# Patient Record
Sex: FEMALE | URBAN - METROPOLITAN AREA
[De-identification: names, ages, dates, MRNs, and addresses within clinical notes are randomized per-mention and may not be internally consistent; named-entity substitution may affect disease eponyms.]

---

## 2019-10-29 ENCOUNTER — APPOINTMENT (OUTPATIENT)
Age: 29
Setting detail: DERMATOLOGY
End: 2019-10-29

## 2019-10-29 VITALS — WEIGHT: 253.53 LBS | RESPIRATION RATE: 16 BRPM | HEIGHT: 61 IN

## 2019-10-29 DIAGNOSIS — L24 IRRITANT CONTACT DERMATITIS: ICD-10-CM

## 2019-10-29 PROBLEM — L24.9 IRRITANT CONTACT DERMATITIS, UNSPECIFIED CAUSE: Status: ACTIVE | Noted: 2019-10-29

## 2019-10-29 PROCEDURE — 99202 OFFICE O/P NEW SF 15 MIN: CPT

## 2019-10-29 PROCEDURE — OTHER COUNSELING: OTHER

## 2019-10-29 ASSESSMENT — LOCATION ZONE DERM: LOCATION ZONE: FACE

## 2019-10-29 ASSESSMENT — LOCATION SIMPLE DESCRIPTION DERM
LOCATION SIMPLE: RIGHT CHEEK
LOCATION SIMPLE: LEFT CHEEK

## 2019-10-29 ASSESSMENT — LOCATION DETAILED DESCRIPTION DERM
LOCATION DETAILED: RIGHT SUPERIOR LATERAL MALAR CHEEK
LOCATION DETAILED: LEFT SUPERIOR CENTRAL MALAR CHEEK
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
## 2019-10-29 NOTE — PROCEDURE: COUNSELING
Patient Specific Counseling (Will Not Stick From Patient To Patient): Recommend using products that are fragrance free. Samples of vanicream. Ointment would be the best recommendation for under eyes. Samples of vaniply,cerave oint, and aquaphor ointment. Try each one to see which one works. Samples of vanicream cream and neutrogena hydro boost were given to try on rest of face, Can continue dermalogic cleanser. Patient was concerned about skin cancer from this one sun burn and discussed that if continual exposure over the years could cause skin cancer. Recommend wearing a SPF 30 or greater daily. On [employer] can purchase a powder sunscreen that is less irritating than liquid sunscreens and is called Colorscience and will last a long time,SPF 50 and is water resistant. Patient expressed understanding. Patient Specific Counseling (Will Not Stick From Patient To Patient): Recommend using products that are fragrance free. Samples of vanicream. Ointment would be the best recommendation for under eyes. Samples of vaniply,cerave oint, and aquaphor ointment. Try each one to see which one works. Samples of vanicream cream and neutrogena hydro boost were given to try on rest of face, Can continue dermalogic cleanser. Patient was concerned about skin cancer from this one sun burn and discussed that if continual exposure over the years could cause skin cancer. Recommend wearing a SPF 30 or greater daily. On [employer] can purchase a powder sunscreen that is less irritating than liquid sunscreens and is called Colorscience and will last a long time,SPF 50 and is water resistant. Patient expressed understanding.